# Patient Record
(demographics unavailable — no encounter records)

---

## 2025-02-10 NOTE — ASSESSMENT
[FreeTextEntry1] : 46-year-old male who has made significant progress in the last year, losing 45 pounds with a combination of better diet and Wegovy.  The missing link is still more regular exercise and he will try to increase that.  I have also suggested that he increase his fluid intake -his BUN of 30 and high BUN to creatinine ratio suggest his volume intake is not enough.  Will also need to watch and monitor his renal function.  His lipids have improved as has his A1c.  He has benefited from psychological counseling, and his move 3 years ago has worked out beautifully, particularly with respect to his autistic son who is thriving in school.  He is considering entering a PhD program.  He continues to assist with his parents health care, but is able to deal with the stress much more efficiently than in the past.  He will return in 5 to 6 months and will follow-up regularly with Dr. Vargas.   Time spent 45 minutes

## 2025-02-10 NOTE — PHYSICAL EXAM
[General Appearance - Alert] : alert [General Appearance - In No Acute Distress] : in no acute distress [Sclera] : the sclera and conjunctiva were normal [PERRL With Normal Accommodation] : pupils were equal in size, round, and reactive to light [Outer Ear] : the ears and nose were normal in appearance [Neck Appearance] : the appearance of the neck was normal [Neck Cervical Mass (___cm)] : no neck mass was observed [Auscultation Breath Sounds / Voice Sounds] : lungs were clear to auscultation bilaterally [Heart Rate And Rhythm] : heart rate was normal and rhythm regular [Heart Sounds] : normal S1 and S2 [Heart Sounds Gallop] : no gallops [Murmurs] : no murmurs [Heart Sounds Pericardial Friction Rub] : no pericardial rub [Skin Color & Pigmentation] : normal skin color and pigmentation [Skin Turgor] : normal skin turgor [] : no rash [Deep Tendon Reflexes (DTR)] : deep tendon reflexes were 2+ and symmetric [Sensation] : the sensory exam was normal to light touch and pinprick [No Focal Deficits] : no focal deficits [Oriented To Time, Place, And Person] : oriented to person, place, and time [Impaired Insight] : insight and judgment were intact [Affect] : the affect was normal

## 2025-02-10 NOTE — CONSULT LETTER
[Dear  ___] : Dear  [unfilled], [Consult Letter:] : I had the pleasure of evaluating your patient, [unfilled]. [FreeTextEntry2] : Dr Marques Vargas - [place] [FreeTextEntry1] : Dear Dr Vargas -I just want to say thank you for all you have done and continue to do for Mustapha.  He has made great progress and your help is most appreciated.  Best regards, Ray Daily

## 2025-02-10 NOTE — HISTORY OF PRESENT ILLNESS
[FreeTextEntry1] : 46-year-old man with a long history of hypertension and a very large component of whitecoat effect in the past, plus morbid obesity, hyperlipidemia, metabolic syndrome, and in the past, microalbuminuria.  His weight reached a maximum of 365 pounds near the end of the pandemic.  He has now lost 45 pounds and is down to 320 pounds, on Wegovy.  He has altered his diet pretty dramatically, and does experience some nausea, but no constipation.  He sees his PCP every 2 months, and has benefited greatly from psychological counseling.  He is drinking about 2 cups of coffee per day instead of 6.  He has oatmeal for breakfast, soup or sandwich for lunch, small meals for dinner and no snacking after.  His BUN is 30, and I have reminded him again that he needs to drink more fluid.  His creatinine is 1.24 with a GFR of 73. -Those are not as good as in the past and need to be watched.  Microalbuminuria has improved from 83 down to 17, thus normalizing.  His lipids have improved with total cholesterol dropping from 273 down to 180, and LDL down to 118.  His HDL is still only 39, and I have implored him to increase his exercise, which is limited to at most 1-2 times per week.  His A1c is 5.5 and he has been prediabetic for years.  His PSA is normal at 0.75.  His potassium is normal.  His hemoglobin is down to 12.8 for reasons that are not clear, and I have suggested that he discuss that with his PCP, Dr. Marques Vargas.  The anemia is normocytic normochromic, not suggestive of iron deficiency or GI blood loss.

## 2025-07-15 NOTE — CONSULT LETTER
[Dear  ___] : Dear  [unfilled], [Consult Letter:] : I had the pleasure of evaluating your patient, [unfilled]. [Please see my note below.] : Please see my note below. [Consult Closing:] : Thank you very much for allowing me to participate in the care of this patient.  If you have any questions, please do not hesitate to contact me. [Sincerely,] : Sincerely, [FreeTextEntry2] : Dr Marques Vargas [FreeTextEntry3] : Sincerely,   Kermit Daily MD, FACP

## 2025-07-15 NOTE — ASSESSMENT
[FreeTextEntry1] : 46-year-old male who has done beautifully on Wegovy and has now lost 64 pounds in 13 months.  He will continue on amlodipine 10 mg daily and Edarbi chlor 40/12.5 mg daily.  He will return in 6 months and will follow-up with Dr. HOBSON

## 2025-07-15 NOTE — HISTORY OF PRESENT ILLNESS
[FreeTextEntry1] : 46-year-old  with a long history of hypertension and a large amount of whitecoat effect in the past.  He has struggled with morbid obesity, hyperlipidemia, metabolic syndrome, as well as microalbuminuria.  His weight peaked at 365 pounds near the end of the pandemic.  He has now lost nearly 65 pounds and is down to 301 on Wegovy.  He has altered his diet dramatically and tries to walk with some regularity.  He has cut back his coffee consumption dramatically.  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  He feels great.  He does not experience fatigue or shortness of breath when walking anymore.  He will be seeing his PCP, Dr. Marques Vargas on August 17.